# Patient Record
Sex: FEMALE | Race: WHITE | NOT HISPANIC OR LATINO | Employment: OTHER | URBAN - METROPOLITAN AREA
[De-identification: names, ages, dates, MRNs, and addresses within clinical notes are randomized per-mention and may not be internally consistent; named-entity substitution may affect disease eponyms.]

---

## 2023-11-13 ENCOUNTER — LAB REQUISITION (OUTPATIENT)
Dept: LAB | Facility: CLINIC | Age: 33
End: 2023-11-13
Payer: COMMERCIAL

## 2023-11-13 DIAGNOSIS — Z33.1 PREGNANT STATE, INCIDENTAL: ICD-10-CM

## 2023-11-13 LAB
ABO/RH(D): NORMAL
ANTIBODY SCREEN: NEGATIVE
BASOPHILS # BLD AUTO: 0 10E3/UL (ref 0–0.2)
BASOPHILS NFR BLD AUTO: 0 %
EOSINOPHIL # BLD AUTO: 0 10E3/UL (ref 0–0.7)
EOSINOPHIL NFR BLD AUTO: 0 %
ERYTHROCYTE [DISTWIDTH] IN BLOOD BY AUTOMATED COUNT: 12.7 % (ref 10–15)
HBV SURFACE AG SERPL QL IA: NONREACTIVE
HCT VFR BLD AUTO: 42.3 % (ref 35–47)
HCV AB SERPL QL IA: NONREACTIVE
HGB BLD-MCNC: 14.6 G/DL (ref 11.7–15.7)
HIV 1+2 AB+HIV1 P24 AG SERPL QL IA: NONREACTIVE
IMM GRANULOCYTES # BLD: 0 10E3/UL
IMM GRANULOCYTES NFR BLD: 0 %
LYMPHOCYTES # BLD AUTO: 1.8 10E3/UL (ref 0.8–5.3)
LYMPHOCYTES NFR BLD AUTO: 19 %
MCH RBC QN AUTO: 30.5 PG (ref 26.5–33)
MCHC RBC AUTO-ENTMCNC: 34.5 G/DL (ref 31.5–36.5)
MCV RBC AUTO: 89 FL (ref 78–100)
MONOCYTES # BLD AUTO: 0.4 10E3/UL (ref 0–1.3)
MONOCYTES NFR BLD AUTO: 5 %
NEUTROPHILS # BLD AUTO: 7 10E3/UL (ref 1.6–8.3)
NEUTROPHILS NFR BLD AUTO: 76 %
NRBC # BLD AUTO: 0 10E3/UL
NRBC BLD AUTO-RTO: 0 /100
PLATELET # BLD AUTO: 274 10E3/UL (ref 150–450)
RBC # BLD AUTO: 4.78 10E6/UL (ref 3.8–5.2)
SPECIMEN EXPIRATION DATE: NORMAL
WBC # BLD AUTO: 9.3 10E3/UL (ref 4–11)

## 2023-11-13 PROCEDURE — 87389 HIV-1 AG W/HIV-1&-2 AB AG IA: CPT | Mod: ORL | Performed by: OBSTETRICS & GYNECOLOGY

## 2023-11-13 PROCEDURE — 87086 URINE CULTURE/COLONY COUNT: CPT | Mod: ORL | Performed by: OBSTETRICS & GYNECOLOGY

## 2023-11-13 PROCEDURE — 86901 BLOOD TYPING SEROLOGIC RH(D): CPT | Mod: ORL | Performed by: OBSTETRICS & GYNECOLOGY

## 2023-11-13 PROCEDURE — 86850 RBC ANTIBODY SCREEN: CPT | Mod: ORL | Performed by: OBSTETRICS & GYNECOLOGY

## 2023-11-13 PROCEDURE — 85025 COMPLETE CBC W/AUTO DIFF WBC: CPT | Mod: ORL | Performed by: OBSTETRICS & GYNECOLOGY

## 2023-11-13 PROCEDURE — 87340 HEPATITIS B SURFACE AG IA: CPT | Mod: ORL | Performed by: OBSTETRICS & GYNECOLOGY

## 2023-11-13 PROCEDURE — 86803 HEPATITIS C AB TEST: CPT | Mod: ORL | Performed by: OBSTETRICS & GYNECOLOGY

## 2023-11-13 PROCEDURE — 86592 SYPHILIS TEST NON-TREP QUAL: CPT | Mod: ORL | Performed by: OBSTETRICS & GYNECOLOGY

## 2023-11-13 PROCEDURE — 86762 RUBELLA ANTIBODY: CPT | Mod: ORL | Performed by: OBSTETRICS & GYNECOLOGY

## 2023-11-14 LAB
BACTERIA UR CULT: NO GROWTH
RPR SER QL: NONREACTIVE
RUBV IGG SERPL QL IA: 21.4 INDEX
RUBV IGG SERPL QL IA: POSITIVE

## 2023-12-08 ENCOUNTER — LAB REQUISITION (OUTPATIENT)
Dept: LAB | Facility: CLINIC | Age: 33
End: 2023-12-08
Payer: COMMERCIAL

## 2023-12-08 DIAGNOSIS — Z36.89 ENCOUNTER FOR OTHER SPECIFIED ANTENATAL SCREENING: ICD-10-CM

## 2023-12-08 PROCEDURE — 82105 ALPHA-FETOPROTEIN SERUM: CPT | Mod: ORL | Performed by: OBSTETRICS & GYNECOLOGY

## 2023-12-11 LAB
# FETUSES US: NORMAL
AFP MOM SERPL: 0.82
AFP SERPL-MCNC: 31 NG/ML
AGE - REPORTED: 33.7 YR
CURRENT SMOKER: NO
FAMILY MEMBER DISEASES HX: NO
GA METHOD: NORMAL
GA: NORMAL WK
IDDM PATIENT QL: NO
INTEGRATED SCN PATIENT-IMP: NORMAL
SPECIMEN DRAWN SERPL: NORMAL

## 2024-04-25 ENCOUNTER — LAB REQUISITION (OUTPATIENT)
Dept: LAB | Facility: CLINIC | Age: 34
End: 2024-04-25
Payer: COMMERCIAL

## 2024-04-25 DIAGNOSIS — Z36.85 ENCOUNTER FOR ANTENATAL SCREENING FOR STREPTOCOCCUS B: ICD-10-CM

## 2024-04-25 PROCEDURE — 87653 STREP B DNA AMP PROBE: CPT | Mod: ORL | Performed by: STUDENT IN AN ORGANIZED HEALTH CARE EDUCATION/TRAINING PROGRAM

## 2024-04-26 LAB — GP B STREP DNA SPEC QL NAA+PROBE: NEGATIVE

## 2024-05-27 ENCOUNTER — HOSPITAL ENCOUNTER (INPATIENT)
Facility: CLINIC | Age: 34
LOS: 2 days | Discharge: HOME | End: 2024-05-29
Attending: OBSTETRICS & GYNECOLOGY | Admitting: STUDENT IN AN ORGANIZED HEALTH CARE EDUCATION/TRAINING PROGRAM
Payer: COMMERCIAL

## 2024-05-27 DIAGNOSIS — Z36.89 ENCOUNTER FOR TRIAGE IN PREGNANT PATIENT: Primary | ICD-10-CM

## 2024-05-27 LAB
ABO/RH(D): NORMAL
ANTIBODY SCREEN: NEGATIVE
ERYTHROCYTE [DISTWIDTH] IN BLOOD BY AUTOMATED COUNT: 12.5 % (ref 10–15)
HCT VFR BLD AUTO: 35.2 % (ref 35–47)
HGB BLD-MCNC: 12.3 G/DL (ref 11.7–15.7)
MCH RBC QN AUTO: 30.8 PG (ref 26.5–33)
MCHC RBC AUTO-ENTMCNC: 34.9 G/DL (ref 31.5–36.5)
MCV RBC AUTO: 88 FL (ref 78–100)
PLATELET # BLD AUTO: 218 10E3/UL (ref 150–450)
RBC # BLD AUTO: 3.99 10E6/UL (ref 3.8–5.2)
RUPTURE OF FETAL MEMBRANES BY ROM PLUS: POSITIVE
SPECIMEN EXPIRATION DATE: NORMAL
WBC # BLD AUTO: 13.3 10E3/UL (ref 4–11)

## 2024-05-27 PROCEDURE — 10907ZC DRAINAGE OF AMNIOTIC FLUID, THERAPEUTIC FROM PRODUCTS OF CONCEPTION, VIA NATURAL OR ARTIFICIAL OPENING: ICD-10-PCS | Performed by: STUDENT IN AN ORGANIZED HEALTH CARE EDUCATION/TRAINING PROGRAM

## 2024-05-27 PROCEDURE — 86900 BLOOD TYPING SEROLOGIC ABO: CPT | Performed by: OBSTETRICS & GYNECOLOGY

## 2024-05-27 PROCEDURE — 250N000009 HC RX 250: Performed by: OBSTETRICS & GYNECOLOGY

## 2024-05-27 PROCEDURE — G0463 HOSPITAL OUTPT CLINIC VISIT: HCPCS

## 2024-05-27 PROCEDURE — 258N000003 HC RX IP 258 OP 636: Performed by: OBSTETRICS & GYNECOLOGY

## 2024-05-27 PROCEDURE — 120N000001 HC R&B MED SURG/OB

## 2024-05-27 PROCEDURE — 36415 COLL VENOUS BLD VENIPUNCTURE: CPT | Performed by: OBSTETRICS & GYNECOLOGY

## 2024-05-27 PROCEDURE — 85027 COMPLETE CBC AUTOMATED: CPT | Performed by: OBSTETRICS & GYNECOLOGY

## 2024-05-27 PROCEDURE — 86780 TREPONEMA PALLIDUM: CPT | Performed by: OBSTETRICS & GYNECOLOGY

## 2024-05-27 PROCEDURE — 84112 EVAL AMNIOTIC FLUID PROTEIN: CPT | Performed by: OBSTETRICS & GYNECOLOGY

## 2024-05-27 RX ORDER — PROCHLORPERAZINE MALEATE 5 MG
10 TABLET ORAL EVERY 6 HOURS PRN
Status: DISCONTINUED | OUTPATIENT
Start: 2024-05-27 | End: 2024-05-27 | Stop reason: HOSPADM

## 2024-05-27 RX ORDER — CARBOPROST TROMETHAMINE 250 UG/ML
250 INJECTION, SOLUTION INTRAMUSCULAR
Status: DISCONTINUED | OUTPATIENT
Start: 2024-05-27 | End: 2024-05-28 | Stop reason: HOSPADM

## 2024-05-27 RX ORDER — ONDANSETRON 2 MG/ML
4 INJECTION INTRAMUSCULAR; INTRAVENOUS EVERY 6 HOURS PRN
Status: DISCONTINUED | OUTPATIENT
Start: 2024-05-27 | End: 2024-05-28 | Stop reason: HOSPADM

## 2024-05-27 RX ORDER — KETOROLAC TROMETHAMINE 30 MG/ML
30 INJECTION, SOLUTION INTRAMUSCULAR; INTRAVENOUS
Status: DISCONTINUED | OUTPATIENT
Start: 2024-05-27 | End: 2024-05-29 | Stop reason: HOSPADM

## 2024-05-27 RX ORDER — OXYTOCIN/0.9 % SODIUM CHLORIDE 30/500 ML
1-24 PLASTIC BAG, INJECTION (ML) INTRAVENOUS CONTINUOUS
Status: DISCONTINUED | OUTPATIENT
Start: 2024-05-27 | End: 2024-05-28 | Stop reason: HOSPADM

## 2024-05-27 RX ORDER — PROCHLORPERAZINE 25 MG
25 SUPPOSITORY, RECTAL RECTAL EVERY 12 HOURS PRN
Status: DISCONTINUED | OUTPATIENT
Start: 2024-05-27 | End: 2024-05-28 | Stop reason: HOSPADM

## 2024-05-27 RX ORDER — PROCHLORPERAZINE MALEATE 5 MG
10 TABLET ORAL EVERY 6 HOURS PRN
Status: DISCONTINUED | OUTPATIENT
Start: 2024-05-27 | End: 2024-05-28 | Stop reason: HOSPADM

## 2024-05-27 RX ORDER — LOPERAMIDE HCL 2 MG
2 CAPSULE ORAL
Status: DISCONTINUED | OUTPATIENT
Start: 2024-05-27 | End: 2024-05-28 | Stop reason: HOSPADM

## 2024-05-27 RX ORDER — LOPERAMIDE HCL 2 MG
4 CAPSULE ORAL
Status: DISCONTINUED | OUTPATIENT
Start: 2024-05-27 | End: 2024-05-28 | Stop reason: HOSPADM

## 2024-05-27 RX ORDER — NALOXONE HYDROCHLORIDE 0.4 MG/ML
0.2 INJECTION, SOLUTION INTRAMUSCULAR; INTRAVENOUS; SUBCUTANEOUS
Status: DISCONTINUED | OUTPATIENT
Start: 2024-05-27 | End: 2024-05-28 | Stop reason: HOSPADM

## 2024-05-27 RX ORDER — METOCLOPRAMIDE HYDROCHLORIDE 5 MG/ML
10 INJECTION INTRAMUSCULAR; INTRAVENOUS EVERY 6 HOURS PRN
Status: DISCONTINUED | OUTPATIENT
Start: 2024-05-27 | End: 2024-05-28 | Stop reason: HOSPADM

## 2024-05-27 RX ORDER — OXYTOCIN/0.9 % SODIUM CHLORIDE 30/500 ML
100-340 PLASTIC BAG, INJECTION (ML) INTRAVENOUS CONTINUOUS PRN
Status: DISCONTINUED | OUTPATIENT
Start: 2024-05-27 | End: 2024-05-29 | Stop reason: HOSPADM

## 2024-05-27 RX ORDER — METOCLOPRAMIDE 10 MG/1
10 TABLET ORAL EVERY 6 HOURS PRN
Status: DISCONTINUED | OUTPATIENT
Start: 2024-05-27 | End: 2024-05-28 | Stop reason: HOSPADM

## 2024-05-27 RX ORDER — TERBUTALINE SULFATE 1 MG/ML
0.25 INJECTION, SOLUTION SUBCUTANEOUS
Status: DISCONTINUED | OUTPATIENT
Start: 2024-05-27 | End: 2024-05-28 | Stop reason: HOSPADM

## 2024-05-27 RX ORDER — NALOXONE HYDROCHLORIDE 0.4 MG/ML
0.4 INJECTION, SOLUTION INTRAMUSCULAR; INTRAVENOUS; SUBCUTANEOUS
Status: DISCONTINUED | OUTPATIENT
Start: 2024-05-27 | End: 2024-05-28 | Stop reason: HOSPADM

## 2024-05-27 RX ORDER — OXYTOCIN 10 [USP'U]/ML
10 INJECTION, SOLUTION INTRAMUSCULAR; INTRAVENOUS
Status: DISCONTINUED | OUTPATIENT
Start: 2024-05-27 | End: 2024-05-29 | Stop reason: HOSPADM

## 2024-05-27 RX ORDER — SODIUM CHLORIDE, SODIUM LACTATE, POTASSIUM CHLORIDE, CALCIUM CHLORIDE 600; 310; 30; 20 MG/100ML; MG/100ML; MG/100ML; MG/100ML
INJECTION, SOLUTION INTRAVENOUS CONTINUOUS PRN
Status: DISCONTINUED | OUTPATIENT
Start: 2024-05-27 | End: 2024-05-28 | Stop reason: HOSPADM

## 2024-05-27 RX ORDER — METOCLOPRAMIDE HYDROCHLORIDE 5 MG/ML
10 INJECTION INTRAMUSCULAR; INTRAVENOUS EVERY 6 HOURS PRN
Status: DISCONTINUED | OUTPATIENT
Start: 2024-05-27 | End: 2024-05-27 | Stop reason: HOSPADM

## 2024-05-27 RX ORDER — MISOPROSTOL 200 UG/1
800 TABLET ORAL
Status: DISCONTINUED | OUTPATIENT
Start: 2024-05-27 | End: 2024-05-28 | Stop reason: HOSPADM

## 2024-05-27 RX ORDER — CITRIC ACID/SODIUM CITRATE 334-500MG
30 SOLUTION, ORAL ORAL
Status: COMPLETED | OUTPATIENT
Start: 2024-05-27 | End: 2024-05-28

## 2024-05-27 RX ORDER — SODIUM CHLORIDE, SODIUM LACTATE, POTASSIUM CHLORIDE, CALCIUM CHLORIDE 600; 310; 30; 20 MG/100ML; MG/100ML; MG/100ML; MG/100ML
INJECTION, SOLUTION INTRAVENOUS CONTINUOUS
Status: DISCONTINUED | OUTPATIENT
Start: 2024-05-27 | End: 2024-05-28 | Stop reason: HOSPADM

## 2024-05-27 RX ORDER — ONDANSETRON 4 MG/1
4 TABLET, ORALLY DISINTEGRATING ORAL EVERY 6 HOURS PRN
Status: DISCONTINUED | OUTPATIENT
Start: 2024-05-27 | End: 2024-05-28 | Stop reason: HOSPADM

## 2024-05-27 RX ORDER — IBUPROFEN 400 MG/1
800 TABLET, FILM COATED ORAL
Status: DISCONTINUED | OUTPATIENT
Start: 2024-05-27 | End: 2024-05-29 | Stop reason: HOSPADM

## 2024-05-27 RX ORDER — TRANEXAMIC ACID 10 MG/ML
1 INJECTION, SOLUTION INTRAVENOUS EVERY 30 MIN PRN
Status: DISCONTINUED | OUTPATIENT
Start: 2024-05-27 | End: 2024-05-28 | Stop reason: HOSPADM

## 2024-05-27 RX ORDER — PROCHLORPERAZINE 25 MG
25 SUPPOSITORY, RECTAL RECTAL EVERY 12 HOURS PRN
Status: DISCONTINUED | OUTPATIENT
Start: 2024-05-27 | End: 2024-05-27 | Stop reason: HOSPADM

## 2024-05-27 RX ORDER — MISOPROSTOL 200 UG/1
400 TABLET ORAL
Status: DISCONTINUED | OUTPATIENT
Start: 2024-05-27 | End: 2024-05-28 | Stop reason: HOSPADM

## 2024-05-27 RX ORDER — ONDANSETRON 4 MG/1
4 TABLET, ORALLY DISINTEGRATING ORAL EVERY 6 HOURS PRN
Status: DISCONTINUED | OUTPATIENT
Start: 2024-05-27 | End: 2024-05-27 | Stop reason: HOSPADM

## 2024-05-27 RX ORDER — ACETAMINOPHEN 325 MG/1
650 TABLET ORAL EVERY 4 HOURS PRN
Status: DISCONTINUED | OUTPATIENT
Start: 2024-05-27 | End: 2024-05-28 | Stop reason: HOSPADM

## 2024-05-27 RX ORDER — METOCLOPRAMIDE 10 MG/1
10 TABLET ORAL EVERY 6 HOURS PRN
Status: DISCONTINUED | OUTPATIENT
Start: 2024-05-27 | End: 2024-05-27 | Stop reason: HOSPADM

## 2024-05-27 RX ORDER — METHYLERGONOVINE MALEATE 0.2 MG/ML
200 INJECTION INTRAVENOUS
Status: DISCONTINUED | OUTPATIENT
Start: 2024-05-27 | End: 2024-05-28 | Stop reason: HOSPADM

## 2024-05-27 RX ORDER — OXYTOCIN/0.9 % SODIUM CHLORIDE 30/500 ML
340 PLASTIC BAG, INJECTION (ML) INTRAVENOUS CONTINUOUS PRN
Status: DISCONTINUED | OUTPATIENT
Start: 2024-05-27 | End: 2024-05-28 | Stop reason: HOSPADM

## 2024-05-27 RX ORDER — FENTANYL CITRATE 50 UG/ML
100 INJECTION, SOLUTION INTRAMUSCULAR; INTRAVENOUS
Status: DISCONTINUED | OUTPATIENT
Start: 2024-05-27 | End: 2024-05-28 | Stop reason: HOSPADM

## 2024-05-27 RX ORDER — OXYTOCIN 10 [USP'U]/ML
10 INJECTION, SOLUTION INTRAMUSCULAR; INTRAVENOUS
Status: DISCONTINUED | OUTPATIENT
Start: 2024-05-27 | End: 2024-05-28 | Stop reason: HOSPADM

## 2024-05-27 RX ORDER — LIDOCAINE 40 MG/G
CREAM TOPICAL
Status: DISCONTINUED | OUTPATIENT
Start: 2024-05-27 | End: 2024-05-28 | Stop reason: HOSPADM

## 2024-05-27 RX ORDER — ONDANSETRON 2 MG/ML
4 INJECTION INTRAMUSCULAR; INTRAVENOUS EVERY 6 HOURS PRN
Status: DISCONTINUED | OUTPATIENT
Start: 2024-05-27 | End: 2024-05-27 | Stop reason: HOSPADM

## 2024-05-27 RX ADMIN — Medication 2 MILLI-UNITS/MIN: at 23:07

## 2024-05-27 RX ADMIN — SODIUM CHLORIDE, POTASSIUM CHLORIDE, SODIUM LACTATE AND CALCIUM CHLORIDE: 600; 310; 30; 20 INJECTION, SOLUTION INTRAVENOUS at 22:51

## 2024-05-27 ASSESSMENT — ACTIVITIES OF DAILY LIVING (ADL)
ADLS_ACUITY_SCORE: 18

## 2024-05-27 NOTE — PROGRESS NOTES
Patient presents to [facility] ambulatory with  Peter. Brought to [facility] 3.    Monitors applied and vitals taken with consent. Here for r/o rupture of membranes      40w 4d  GBS -    Patient states she had a gush of clear fluid at 0415 in the bathroom. Had another gush at 0500 with more clear fluid with pink tinge. Has had continuous leaking since. Has had mild contractions, with tightening, irregular, mostly back pain.     ROM + collected with consent,    ROM+ positive result  SVE with consent, /-1    1600 Orders received from Octavio LORENZO to admit patient, start pitocin if patient consents, admission labs, update in 2 hours, anything for pain. Report given to Magdalena CHUA to assume primary care.

## 2024-05-27 NOTE — PROGRESS NOTES
I was in the [facility] department and asked by Magdalena Tapia, RN to perform a courtesy AROM of a forebag on behalf of the patient's primary care team and Dr. Cunningham.     AROM of a bulging forebag performed without complication and a small amount of clear fluid noted. SVE 3/80%/-1. Cat I FHTs.     JOSEFA Bradley CNM   [facility]  5/27/2024 at 6:19 PM

## 2024-05-27 NOTE — PLAN OF CARE
Data: Patient admitted to room 219 at 1615. Patient is a . Prenatal record reviewed.   OB History    Para Term  AB Living   1 0 0 0 0 0   SAB IAB Ectopic Multiple Live Births   0 0 0 0 0      # Outcome Date GA Lbr Steve/2nd Weight Sex Type Anes PTL Lv   1 Current            .  Medical History: History reviewed. No pertinent past medical history..  Gestational age 40w4d. Vital signs per doc flowsheet. Fetal movement present. Patient reports Rule out rupture of membranes   as reason for admission. Support persons Peter present.  Action: Report from TOMA Haider obtained at 1605. Care of patient assumed at 1605. Admission assessment completed. Patient and support persons educated on labor process. Patient instructed to report change in fetal movement, contractions, vaginal leaking of fluid or bleeding, abdominal pain, or any concerns related to the pregnancy to her nurse/physician. Patient oriented to room, call light in reach.   Response: Plan per provider is expectant management, pitocin augmentation if pt agreeable. Patient verbalized understanding of education and verbalized agreement with plan. Pt would like to wait 2 hours to reassess SVE before starting pitocin.

## 2024-05-28 ENCOUNTER — ANESTHESIA (OUTPATIENT)
Dept: OBGYN | Facility: CLINIC | Age: 34
End: 2024-05-28
Payer: COMMERCIAL

## 2024-05-28 ENCOUNTER — ANESTHESIA EVENT (OUTPATIENT)
Dept: OBGYN | Facility: CLINIC | Age: 34
End: 2024-05-28
Payer: COMMERCIAL

## 2024-05-28 LAB — T PALLIDUM AB SER QL: NONREACTIVE

## 2024-05-28 PROCEDURE — 0KQM0ZZ REPAIR PERINEUM MUSCLE, OPEN APPROACH: ICD-10-PCS | Performed by: STUDENT IN AN ORGANIZED HEALTH CARE EDUCATION/TRAINING PROGRAM

## 2024-05-28 PROCEDURE — 250N000009 HC RX 250: Performed by: OBSTETRICS & GYNECOLOGY

## 2024-05-28 PROCEDURE — 59400 OBSTETRICAL CARE: CPT | Performed by: ANESTHESIOLOGY

## 2024-05-28 PROCEDURE — 370N000003 HC ANESTHESIA WARD SERVICE: Performed by: ANESTHESIOLOGY

## 2024-05-28 PROCEDURE — 250N000011 HC RX IP 250 OP 636: Performed by: STUDENT IN AN ORGANIZED HEALTH CARE EDUCATION/TRAINING PROGRAM

## 2024-05-28 PROCEDURE — 250N000009 HC RX 250: Performed by: STUDENT IN AN ORGANIZED HEALTH CARE EDUCATION/TRAINING PROGRAM

## 2024-05-28 PROCEDURE — 250N000011 HC RX IP 250 OP 636: Performed by: ANESTHESIOLOGY

## 2024-05-28 PROCEDURE — 00HU33Z INSERTION OF INFUSION DEVICE INTO SPINAL CANAL, PERCUTANEOUS APPROACH: ICD-10-PCS | Performed by: ANESTHESIOLOGY

## 2024-05-28 PROCEDURE — 250N000013 HC RX MED GY IP 250 OP 250 PS 637: Performed by: OBSTETRICS & GYNECOLOGY

## 2024-05-28 PROCEDURE — 722N000001 HC LABOR CARE VAGINAL DELIVERY SINGLE

## 2024-05-28 PROCEDURE — 250N000013 HC RX MED GY IP 250 OP 250 PS 637: Performed by: STUDENT IN AN ORGANIZED HEALTH CARE EDUCATION/TRAINING PROGRAM

## 2024-05-28 PROCEDURE — 120N000012 HC R&B POSTPARTUM

## 2024-05-28 PROCEDURE — 3E0R3BZ INTRODUCTION OF ANESTHETIC AGENT INTO SPINAL CANAL, PERCUTANEOUS APPROACH: ICD-10-PCS | Performed by: ANESTHESIOLOGY

## 2024-05-28 RX ORDER — MISOPROSTOL 200 UG/1
400 TABLET ORAL
Status: DISCONTINUED | OUTPATIENT
Start: 2024-05-28 | End: 2024-05-29 | Stop reason: HOSPADM

## 2024-05-28 RX ORDER — ROPIVACAINE HYDROCHLORIDE 2 MG/ML
10 INJECTION, SOLUTION EPIDURAL; INFILTRATION; PERINEURAL ONCE
Status: DISCONTINUED | OUTPATIENT
Start: 2024-05-28 | End: 2024-05-28 | Stop reason: HOSPADM

## 2024-05-28 RX ORDER — NALBUPHINE HYDROCHLORIDE 20 MG/ML
2.5-5 INJECTION, SOLUTION INTRAMUSCULAR; INTRAVENOUS; SUBCUTANEOUS EVERY 6 HOURS PRN
Status: DISCONTINUED | OUTPATIENT
Start: 2024-05-28 | End: 2024-05-29 | Stop reason: HOSPADM

## 2024-05-28 RX ORDER — OXYTOCIN 10 [USP'U]/ML
10 INJECTION, SOLUTION INTRAMUSCULAR; INTRAVENOUS
Status: DISCONTINUED | OUTPATIENT
Start: 2024-05-28 | End: 2024-05-29 | Stop reason: HOSPADM

## 2024-05-28 RX ORDER — LOPERAMIDE HCL 2 MG
4 CAPSULE ORAL
Status: DISCONTINUED | OUTPATIENT
Start: 2024-05-28 | End: 2024-05-29 | Stop reason: HOSPADM

## 2024-05-28 RX ORDER — ROPIVACAINE HYDROCHLORIDE 2 MG/ML
INJECTION, SOLUTION EPIDURAL; INFILTRATION; PERINEURAL
Status: COMPLETED | OUTPATIENT
Start: 2024-05-28 | End: 2024-05-28

## 2024-05-28 RX ORDER — DOCUSATE SODIUM 100 MG/1
100 CAPSULE, LIQUID FILLED ORAL DAILY
Status: DISCONTINUED | OUTPATIENT
Start: 2024-05-28 | End: 2024-05-29 | Stop reason: HOSPADM

## 2024-05-28 RX ORDER — MISOPROSTOL 200 UG/1
800 TABLET ORAL
Status: DISCONTINUED | OUTPATIENT
Start: 2024-05-28 | End: 2024-05-29 | Stop reason: HOSPADM

## 2024-05-28 RX ORDER — METHYLERGONOVINE MALEATE 0.2 MG/ML
200 INJECTION INTRAVENOUS
Status: DISCONTINUED | OUTPATIENT
Start: 2024-05-28 | End: 2024-05-29 | Stop reason: HOSPADM

## 2024-05-28 RX ORDER — HYDROCORTISONE 25 MG/G
CREAM TOPICAL 3 TIMES DAILY PRN
Status: DISCONTINUED | OUTPATIENT
Start: 2024-05-28 | End: 2024-05-29 | Stop reason: HOSPADM

## 2024-05-28 RX ORDER — OXYTOCIN/0.9 % SODIUM CHLORIDE 30/500 ML
340 PLASTIC BAG, INJECTION (ML) INTRAVENOUS CONTINUOUS PRN
Status: DISCONTINUED | OUTPATIENT
Start: 2024-05-28 | End: 2024-05-29 | Stop reason: HOSPADM

## 2024-05-28 RX ORDER — ONDANSETRON 2 MG/ML
4 INJECTION INTRAMUSCULAR; INTRAVENOUS EVERY 6 HOURS PRN
Status: DISCONTINUED | OUTPATIENT
Start: 2024-05-28 | End: 2024-05-28 | Stop reason: HOSPADM

## 2024-05-28 RX ORDER — ONDANSETRON 4 MG/1
4 TABLET, ORALLY DISINTEGRATING ORAL EVERY 6 HOURS PRN
Status: DISCONTINUED | OUTPATIENT
Start: 2024-05-28 | End: 2024-05-28 | Stop reason: HOSPADM

## 2024-05-28 RX ORDER — ACETAMINOPHEN 325 MG/1
650 TABLET ORAL EVERY 4 HOURS PRN
Status: DISCONTINUED | OUTPATIENT
Start: 2024-05-28 | End: 2024-05-29 | Stop reason: HOSPADM

## 2024-05-28 RX ORDER — CARBOPROST TROMETHAMINE 250 UG/ML
250 INJECTION, SOLUTION INTRAMUSCULAR
Status: DISCONTINUED | OUTPATIENT
Start: 2024-05-28 | End: 2024-05-29 | Stop reason: HOSPADM

## 2024-05-28 RX ORDER — MODIFIED LANOLIN
OINTMENT (GRAM) TOPICAL
Status: DISCONTINUED | OUTPATIENT
Start: 2024-05-28 | End: 2024-05-29 | Stop reason: HOSPADM

## 2024-05-28 RX ORDER — LOPERAMIDE HCL 2 MG
2 CAPSULE ORAL
Status: DISCONTINUED | OUTPATIENT
Start: 2024-05-28 | End: 2024-05-29 | Stop reason: HOSPADM

## 2024-05-28 RX ORDER — TRANEXAMIC ACID 10 MG/ML
1 INJECTION, SOLUTION INTRAVENOUS EVERY 30 MIN PRN
Status: DISCONTINUED | OUTPATIENT
Start: 2024-05-28 | End: 2024-05-29 | Stop reason: HOSPADM

## 2024-05-28 RX ORDER — IBUPROFEN 400 MG/1
800 TABLET, FILM COATED ORAL EVERY 6 HOURS PRN
Status: DISCONTINUED | OUTPATIENT
Start: 2024-05-28 | End: 2024-05-29 | Stop reason: HOSPADM

## 2024-05-28 RX ORDER — FENTANYL CITRATE-0.9 % NACL/PF 10 MCG/ML
100 PLASTIC BAG, INJECTION (ML) INTRAVENOUS EVERY 5 MIN PRN
Status: DISCONTINUED | OUTPATIENT
Start: 2024-05-28 | End: 2024-05-28 | Stop reason: HOSPADM

## 2024-05-28 RX ORDER — BISACODYL 10 MG
10 SUPPOSITORY, RECTAL RECTAL DAILY PRN
Status: DISCONTINUED | OUTPATIENT
Start: 2024-05-28 | End: 2024-05-29 | Stop reason: HOSPADM

## 2024-05-28 RX ADMIN — MISOPROSTOL 800 MCG: 200 TABLET ORAL at 10:49

## 2024-05-28 RX ADMIN — ACETAMINOPHEN 650 MG: 325 TABLET, FILM COATED ORAL at 20:41

## 2024-05-28 RX ADMIN — Medication: at 03:49

## 2024-05-28 RX ADMIN — ACETAMINOPHEN 650 MG: 325 TABLET, FILM COATED ORAL at 13:28

## 2024-05-28 RX ADMIN — Medication 100 ML/HR: at 12:02

## 2024-05-28 RX ADMIN — IBUPROFEN 800 MG: 400 TABLET ORAL at 13:28

## 2024-05-28 RX ADMIN — METHYLERGONOVINE MALEATE 200 MCG: 0.2 INJECTION INTRAVENOUS at 11:22

## 2024-05-28 RX ADMIN — ROPIVACAINE HYDROCHLORIDE 10 ML: 2 INJECTION, SOLUTION EPIDURAL; INFILTRATION at 03:56

## 2024-05-28 RX ADMIN — IBUPROFEN 800 MG: 400 TABLET ORAL at 20:40

## 2024-05-28 RX ADMIN — TRANEXAMIC ACID 1 G: 10 INJECTION, SOLUTION INTRAVENOUS at 11:29

## 2024-05-28 RX ADMIN — DOCUSATE SODIUM 100 MG: 100 CAPSULE, LIQUID FILLED ORAL at 13:28

## 2024-05-28 RX ADMIN — SODIUM CITRATE AND CITRIC ACID MONOHYDRATE 30 ML: 500; 334 SOLUTION ORAL at 08:57

## 2024-05-28 ASSESSMENT — ACTIVITIES OF DAILY LIVING (ADL)
ADLS_ACUITY_SCORE: 18

## 2024-05-28 NOTE — PLAN OF CARE
Data: Radha See transferred to [facility] via wheelchair at 1300. Baby transferred via parent's arms.  Action: Receiving unit notified of transfer: Yes. Patient and family notified of room change. Report given to Jo-Ann at 1300. Belongings sent to receiving unit. Accompanied by Registered Nurse. Oriented patient to surroundings. Call light within reach. ID bands double-checked with receiving RN.  Response: Patient tolerated transfer and is stable.

## 2024-05-28 NOTE — PROVIDER NOTIFICATION
05/27/24 5562   Provider Notification   Provider Name/Title Dr. Cunningham   Method of Notification Phone   Notification Reason SVE;Status Update     MD notified SVE 4.5/80-90%/-1. Pt agreed to Pitocin. Pit started at 2 mu.

## 2024-05-28 NOTE — PROVIDER NOTIFICATION
05/28/24 0700   Provider Notification   Provider Name/Title Dr. Neely   Method of Notification Phone     MD called unit to inform RN that she will be following pt this AM. Plan is to call MD after shift change.

## 2024-05-28 NOTE — PROGRESS NOTES
RN assumed care of pt @ 1916. RN made aware that pt had been refusing pitocin and an IV. Pt requesting to go unmedicated and have a low intervention labor. Dr. Cunningham called at 2100 for a status update where RN confirmed pt continuing to refuse pitocin and an IV. MD unaware that pitocin was not started. MD stated he continues to recommend pitocin but if the pt refuses to recheck the pt when she becomes more uncomfortable. RN expressed Mds recommendations to pt and RN reviewed the risk of prolonged rupture of membranes. Pt agreeable to pitocin once educated on risks.

## 2024-05-28 NOTE — L&D DELIVERY NOTE
VAGINAL DELIVERY NOTE     SURGEON: Blanquita Kaur MD  PREOPERATIVE DIAGNOSIS: Single intrauterine pregnancy at 40w5d  POSTOPERATIVE DIAGNOSIS: Same, delivered  OPERATION PERFORMED: Normal spontaneous vaginal delivery  ANESTHESIA: Epidural  EBL: 385 mL  FLUIDS: Lactated Ringers at 125 mL/hour  URINE OUTPUT: Not measured  DRAINS: None  SPECIMENS: Cord blood   COMPLICATIONS: None  FINDINGS: Viable female infant weighing PENDING grams with APGARs of 8 and 9 at 1 and 5 minutes, respectively.  CONDITION: Both mother and infant stable in the immediate postpartum period. The patient remained in labor and delivery for recovery and the infant was kept in the room with her.  INDICATIONS: The patient is a 33 year old G1 who presented at 40 weeks estimated gestational age with SROM in early labor. She eventually did require pitocin for labor augmentation due to ineffective contraction pattern. Once pitocin was started, the patient progressed through active labor and had an epidural for pain control.     OPERATION: The patient progressed to complete/complete/+ 2 station and pushed for 180 minutes to deliver a viable female infant weighing PENDING grams with APGARs of 8 and 9 at one and five minutes, respectively via  over an intact perineum under epidural anesthesia. The baby delivered in occiput anterior position. There was a double nuchal cord that was reduced after delivery of the fetal head. The remainder of the body delivered without incident. Nose and mouth were bulb suctioned and the cord was clamped times two and cut. The baby was placed on the maternal abdomen. Placenta, membranes, and a three vessel cord delivered spontaneously and intact. Inspection of the perineum revealed a second degree perineal laceration that was repaired with 3-0 Vicryl in routine fashion with excellent hemostasis. The rectum, sulci, and cervix were inspected and noted to be intact. The fundus was firm with IV oxytocin and fundal massage.  800mcg cytotec was given AK as well to prevent further bleeding. There were no sponges left in the vagina.

## 2024-05-28 NOTE — ANESTHESIA PROCEDURE NOTES
"Epidural catheter Procedure Note    Pre-Procedure   Staff -        Anesthesiologist:  Rody Merrill MD       Performed By: anesthesiologist       Location: OB       Pre-Anesthestic Checklist: patient identified, IV checked, risks and benefits discussed, informed consent, monitors and equipment checked, pre-op evaluation and at physician/surgeon's request  Timeout:       Correct Patient: Yes        Correct Procedure: Yes        Correct Site: Yes        Correct Position: Yes   Procedure Documentation  Procedure: epidural catheter       Patient Position: sitting       Patient Prep/Sterile Barriers: sterile gloves, mask, patient draped       Skin prep: Betadine       Local skin infiltrated with 3 mL of 1% lidocaine.        Insertion Site: L3-4. (midline approach).       Technique: LORT saline        XIOMARA at 5 cm.       Needle Type: Feastiey needle       Needle Gauge: 17.        Needle Length (Inches): 3.5        Catheter: 19 G.          Catheter threaded easily.         4 cm epidural space.         Threaded 9 cm at skin.         # of attempts: 1 and  # of redirects:          : 0.    Assessment/Narrative         Paresthesias: No.       Test dose of 3 mL lidocaine 1.5% w/ 1:200,000 epinephrine at.         Test dose negative, 3 minutes after injection, for signs of intravascular, subdural, or intrathecal injection.       Insertion/Infusion Method: LORT saline       Aspiration negative for Heme or CSF via Epidural Catheter.    Medication(s) Administered   0.2% Ropivacaine (Epidural) - EPIDURAL   10 mL - 5/28/2024 3:56:00 AM   Comments:  Pt tolerated well.   Immediately to supine with CIRA.   FHTs stable post-procedure.   No complications.       FOR [facility] ([facility] [place], [place]) ONLY:   Pain Team Contact information: please page the Pain Team Via [employer]. Search \"Pain\". During daytime hours, please page the attending first. At night please page the resident first.      "

## 2024-05-28 NOTE — H&P
[facility] Labor and Delivery History and Physical    Radha See MRN# [id number]   Age: 33 year old YOB: 1990     Date of Admission:  2024    Primary care provider: Blanquita Kaur         Chief Complaint:   Radha See is a 33 year old G1 who is 40w5d pregnant and being admitted for SROM in early labor. She was 2cm dilated on arrival and made very slow progress to 4cm, eventually she did agree to pitocin augmentation of labor and has made progress since then. Pregnancy has been uncomplicated.           Pregnancy history:     OBSTETRIC HISTORY:    OB History    Para Term  AB Living   1 0 0 0 0 0   SAB IAB Ectopic Multiple Live Births   0 0 0 0 0      # Outcome Date GA Lbr Steve/2nd Weight Sex Type Anes PTL Lv   1 Current                EDC: Estimated Date of Delivery: May 23, 2024    Prenatal Labs:   Lab Results   Component Value Date    AS Negative 2024    HEPBANG Nonreactive 2023    HGB 12.3 2024       GBS Status:   Negative    Active Problem List  Patient Active Problem List   Diagnosis    Encounter for triage in pregnant patient    Indication for care in labor or delivery       Medication Prior to Admission  Medications Prior to Admission   Medication Sig Dispense Refill Last Dose    Prenatal Vit-Fe Fumarate-FA (PRENATAL MULTIVITAMIN  PLUS IRON) 27-1 MG TABS Take by mouth daily      .        Maternal Past Medical History:   History reviewed. No pertinent past medical history.                    Family History:   This patient has no significant family history            Social History:     Social History     Tobacco Use    Smoking status: Never    Smokeless tobacco: Never   Substance Use Topics    Alcohol use: Not on file            Review of Systems:   C: NEGATIVE for fever, chills, change in weight  E/M: NEGATIVE for ear, mouth and throat problems  R: NEGATIVE for significant cough or SOB  CV: NEGATIVE for chest pain, palpitations or  peripheral edema          Physical Exam:   Vitals were reviewed    Constitutional: Awake, alert, cooperative, no apparent distress, and appears stated age.  HEENT: Normocephalic, without obvious abnormality, atramatic  Neck: Supple, symmetrical  Back: Symmetric, no costal vertebral tenderness.  Lungs: No increased work of breathing, good air exchange, clear to auscultation bilaterally, no crackles or wheezing.  Cardiovascular: Regular rate and rhythm  Abdomen: Gravid. Non-tender  Genitourinary: No urethral discharge, normal external genitalia, no hernia.  Neurologic: Awake, alert, oriented to name, place and time.  Cranial nerves II-XII are grossly intact.    Psychiatric: Normal affect, mood  Skin: No rashes, erythema, pallor, petechia or purpura.     Cervix:   Membranes: SROM   Dilation: 10   Effacement: 100%   Station:+2    Presentation:Cephalic  Fetal Heart Rate Tracing: Tier 1 (normal)  Tocometer: external monitor                       Assessment:   Radha See is a G1 at 40w5d admitted with SROM in early labor, now pushing s/p labor augmentation        Plan:   Patient pushing now, making progress. Comfortable with epidural. Continue pushing efforts.     Blanquita Kaur MD

## 2024-05-28 NOTE — PLAN OF CARE
Goal Outcome Evaluation:    Plan of Care Reviewed With: patient    Overall Patient Progress: improving     Pt transferred to room 4402. Vital signs stable. Fundus firm and midline. Breastfeeding q 2-3 hours. Pain controlled with ibuprofen and tylenol. Pt voiding well. Ambulating in room and hallway. Will continue plan of care.

## 2024-05-28 NOTE — PROVIDER NOTIFICATION
05/28/24 0630   Provider Notification   Provider Name/Title Dr. Cunningham   Method of Notification Electronic Page;Phone   Notification Reason Status Update     MD notified pt is complete & +1 station. Pt is comfortable with epidural & not feeling pressure. Plan is to labor down for 45 mins-1 hr.

## 2024-05-28 NOTE — ANESTHESIA PREPROCEDURE EVALUATION
"Anesthesia Pre-Procedure Evaluation    Patient: Radha See   MRN: [id number] : 1990        Procedure :           History reviewed. No pertinent past medical history.   Past Surgical History:   Procedure Laterality Date    LUMPECTOMY BREAST Right       Allergies   Allergen Reactions    Amoxicillin Hives    Penicillins Hives      Social History     Tobacco Use    Smoking status: Never    Smokeless tobacco: Never   Substance Use Topics    Alcohol use: Not on file      Wt Readings from Last 1 Encounters:   No data found for Wt        Anesthesia Evaluation            ROS/MED HX  ENT/Pulmonary:    (-) asthma   Neurologic:  - neg neurologic ROS     Cardiovascular:    (-) PIH   METS/Exercise Tolerance:     Hematologic:     (+)  no anticoagulation therapy, no coagulopathy,             Musculoskeletal:       GI/Hepatic:     (+) GERD,                   Renal/Genitourinary:       Endo:       Psychiatric/Substance Use:       Infectious Disease:       Malignancy:       Other:            Physical Exam    Airway        Mallampati: II   TM distance: > 3 FB   Neck ROM: full     Respiratory Devices and Support         Dental  no notable dental history         Cardiovascular   cardiovascular exam normal          Pulmonary   pulmonary exam normal                OUTSIDE LABS:  CBC:   Lab Results   Component Value Date    WBC 13.3 (H) 2024    WBC 9.3 2023    HGB 12.3 2024    HGB 14.6 2023    HCT 35.2 2024    HCT 42.3 2023     2024     2023     BMP: No results found for: \"NA\", \"POTASSIUM\", \"CHLORIDE\", \"CO2\", \"BUN\", \"CR\", \"GLC\"  COAGS: No results found for: \"PTT\", \"INR\", \"FIBR\"  POC: No results found for: \"BGM\", \"HCG\", \"HCGS\"  HEPATIC: No results found for: \"ALBUMIN\", \"PROTTOTAL\", \"ALT\", \"AST\", \"GGT\", \"ALKPHOS\", \"BILITOTAL\", \"BILIDIRECT\", \"HALEY\"  OTHER: No results found for: \"PH\", \"LACT\", \"A1C\", \"MONIQUE\", \"PHOS\", \"MAG\", \"LIPASE\", \"AMYLASE\", \"TSH\", \"T4\", \"T3\", \"CRP\", " "\"SED\"    Anesthesia Plan    ASA Status:  2       Anesthesia Type: Epidural.              Consents    Anesthesia Plan(s) and associated risks, benefits, and realistic alternatives discussed. Questions answered and patient/representative(s) expressed understanding.     - Discussed:     - Discussed with:  Patient            Postoperative Care            Comments:    Other Comments: Orders to manage the epidural infusion have been entered, and through coordination with the nurse, we will continute to manage and monitor the patient's labor epidural.  We will continuously be available to adjust as needed thruout the entire L&D process.            Rody Merrill MD, MD    I have reviewed the pertinent notes and labs in the chart from the past 30 days and (re)examined the patient.  Any updates or changes from those notes are reflected in this note.                  "

## 2024-05-28 NOTE — PROVIDER NOTIFICATION
05/28/24 0430   Provider Notification   Provider Name/Title Dr. Cunningham   Method of Notification Electronic Page;Phone   Notification Reason SVE;Status Update     MD notified SVE 7.5/80-90%/-1. Pt comfortable with epidural. Pit at 6. FHR baseline 140 w/moderate variability & occasional early decel. Plan is to recheck cervix in 2 hrs.

## 2024-05-28 NOTE — LACTATION NOTE
Initial lactation visit with Radha. Radha shares that infant  well after delivery.     Reviewed normal  feeding behaviors including sleepiness first 24 hours followed by increased alertness and cluster feeding. Encouraged use of feeding log for first few weeks until breastfeeding well established.  Reviewed feeding cues and encouraged family to attempt to breast feed when infant cueing, ensuring 8 or more feedings every 24 hours. Encouraged patient to call for latch check/feeding assist from RNs/IBCLC as needed. Will revisit as needed.

## 2024-05-28 NOTE — PLAN OF CARE
of viable baby girl with Dr Kaur at bedside for delivery. Apgars 8/9. Fundus boggy after delivery, meds given, see MAR. Firm @U, light rubra after medications. Ice to perineum. Bonding well with baby, working on breastfeeding.

## 2024-05-29 VITALS
SYSTOLIC BLOOD PRESSURE: 112 MMHG | RESPIRATION RATE: 18 BRPM | HEART RATE: 60 BPM | WEIGHT: 153.8 LBS | TEMPERATURE: 97.8 F | OXYGEN SATURATION: 99 % | DIASTOLIC BLOOD PRESSURE: 71 MMHG

## 2024-05-29 PROCEDURE — 250N000013 HC RX MED GY IP 250 OP 250 PS 637: Performed by: STUDENT IN AN ORGANIZED HEALTH CARE EDUCATION/TRAINING PROGRAM

## 2024-05-29 RX ORDER — ACETAMINOPHEN 325 MG/1
650 TABLET ORAL EVERY 4 HOURS PRN
COMMUNITY
Start: 2024-05-29

## 2024-05-29 RX ORDER — DOCUSATE SODIUM 100 MG/1
100 CAPSULE, LIQUID FILLED ORAL DAILY
COMMUNITY
Start: 2024-05-30

## 2024-05-29 RX ORDER — IBUPROFEN 800 MG/1
800 TABLET, FILM COATED ORAL EVERY 6 HOURS PRN
COMMUNITY
Start: 2024-05-29

## 2024-05-29 RX ADMIN — DOCUSATE SODIUM 100 MG: 100 CAPSULE, LIQUID FILLED ORAL at 07:55

## 2024-05-29 ASSESSMENT — ACTIVITIES OF DAILY LIVING (ADL)
ADLS_ACUITY_SCORE: 18

## 2024-05-29 NOTE — PLAN OF CARE
D: VSS, assessments WDL. Declines pain meds. Using IP and Tucks. Breastfeeding her baby girl who is latching well. Pt. wants to discharge at 24hrs.   I: Pt. received complete discharge paperwork.  Pt. was given times of last dose for all discharge medications in writing on discharge medication sheets.  Discharge teaching included home medication, pain management, activity restrictions, postpartum cares, and signs and symptoms of infection.    A: Discharge outcomes on care plan met.  Mother states understanding and comfort with self care and follow up care.   P: Pt. Discharged.  Pt. was accompanied by  and left with personal belongings. Pt. to follow up with OB provider per discharge instructions.  Pt. had no further questions at the time of discharge and no unmet needs were identified.  Goal Outcome Evaluation:

## 2024-05-29 NOTE — ANESTHESIA POSTPROCEDURE EVALUATION
Patient: Radha See    Procedure: * No procedures listed *       Anesthesia Type:  Epidural    Note:     Postop Pain Control: Uneventful            Sign Out: Well controlled pain   PONV: No   Neuro/Psych: Uneventful            Sign Out: Acceptable/Baseline neuro status   Airway/Respiratory: Uneventful            Sign Out: Acceptable/Baseline resp. status   CV/Hemodynamics: Uneventful            Sign Out: Acceptable CV status; No obvious hypovolemia; No obvious fluid overload   Other NRE: NONE   DID A NON-ROUTINE EVENT OCCUR? No           Last vitals:  Vitals:    05/29/24 0010 05/29/24 0445 05/29/24 0757   BP: 106/69 90/52 112/71   Pulse: 60 52 60   Resp: 16 16 18   Temp: 36.6  C (97.9  F)  36.6  C (97.8  F)   SpO2:          Electronically Signed By: Rozina Virgen MD  May 29, 2024  4:52 PM

## 2024-05-29 NOTE — PROGRESS NOTES
[facility]       DAILY NOTE - POSTPARTUM DAY 1     SUBJECTIVE:     Pain controlled? Yes  Tolerating a regular diet? YES  Ambulating? YES  Voiding without difficulty? Yes    OBJECTIVE:  Vitals:    24 2041 24 0010 24 0445 24 0757   BP: 116/69 106/69 90/52 112/71   BP Location: Right arm Right arm Right arm    Patient Position: Sitting Semi-Gómez's Semi-Gómez's    Cuff Size:  Adult Regular Adult Regular    Pulse: 77 60 52 60   Resp: 16 16 16 18   Temp:  97.9  F (36.6  C)  97.8  F (36.6  C)   TempSrc:  Oral  Oral   SpO2:           Constitutional: healthy, alert, and no distress    Abdomen:  Uterine fundus is firm, non-tender and at the level of the umbilicus     Lower extremities: no edema      LABS:  Hemoglobin   Date Value Ref Range Status   2024 12.3 11.7 - 15.7 g/dL Final   2023 14.6 11.7 - 15.7 g/dL Final       ASSESSMENT:  Post-partum day #1 s/p   Pregnancy complicated by NO COMPLICATIONS    Doing well.       PLAN:   Discharge today.  Return to clinic in 6 weeks.   Continue routine postpartum cares    Kathy Alanis PA-C

## 2024-05-29 NOTE — PLAN OF CARE
Goal Outcome Evaluation:      Plan of Care Reviewed With: patient, spouse    Overall Patient Progress: improvingOverall Patient Progress: improving       Pt states that pain is being well managed. Pt is up and moving independently, voiding well. Vitally stable. Fundus is firm with minimal lochia present. Bonding well with . Plan of care continues.

## 2024-05-29 NOTE — DISCHARGE INSTRUCTIONS
"Postpartum Care at Home With Your Baby: Care Instructions  Overview     After childbirth (postpartum period), your body goes through many changes as you recover. In these weeks after delivery, try to take good care of yourself. Get rest whenever you can and accept help from others.  It may take 4 to 6 weeks to feel like yourself again, and possibly longer if you had a  birth. You may feel sore or very tired as you recover. After delivery, you may continue to have contractions as the uterus returns to the size it was before your pregnancy. You will also have some vaginal bleeding. And you may have pain around the vagina as you heal. Several days after delivery you may also have pain and swelling in your breasts as they fill with milk. There are things you can do at home to help ease these discomforts.  After childbirth, it's common to feel emotional. You may feel irritable, cry easily, and feel happy one minute and sad the next. This is called the \"baby blues.\" Hormone changes are one cause of these emotional changes. These feelings usually get better within a couple of weeks. If they don't, talk to your doctor or midwife.  In the first couple of weeks after you give birth, your doctor or midwife may want to check in with you and make a plan for follow-up care. You will likely have a complete postpartum visit in the first 3 months after delivery. At that time, your doctor or midwife will check on your recovery and see how you're doing. But if you have questions or concerns before then, you can always call your doctor or midwife.  Follow-up care is a key part of your treatment and safety. Be sure to make and go to all appointments, and call your doctor if you are having problems. It's also a good idea to know your test results and keep a list of the medicines you take.  How can you care for yourself at home?  Taking care of your body  Use pads instead of tampons for bleeding. After birth, you will have bloody " vaginal discharge. You may also pass some blood clots that shouldn't be bigger than an egg. Over the next 6 weeks or so, your bleeding should decrease a little every day and slowly change to a pinkish and then whitish discharge.  For cramps or mild pain, try an over-the-counter pain medicine, such as acetaminophen (Tylenol) or ibuprofen (Advil, Motrin). Read and follow all instructions on the label.  To ease pain around the vagina or from hemorrhoids:  Put ice or a cold pack on the area for 10 to 20 minutes at a time. Put a thin cloth between the ice and your skin.  Try sitting in a few inches of warm water (sitz bath) when you can or after bowel movements.  Clean yourself with a gentle squeeze of warm water from a bottle instead of wiping with toilet paper.  Use witch hazel or hemorrhoid pads (such as Tucks).  Try using a cold compress for sore and swollen breasts. And wear a supportive bra that fits.  Ease constipation by drinking plenty of fluids and eating high-fiber foods. Ask your doctor or midwife about over-the-counter stool softeners.  Activity  Rest when you can.  Ask for help from family or friends when you need it.  If you can, have another adult in your home for at least 2 or 3 days after birth.  When you feel ready, try to get some exercise every day. For many people, walking is a good choice. Don't do any heavy exercise until your doctor or midwife says it's okay.  Ask your doctor or midwife when it is okay to have vaginal sex.  If you don't want to get pregnant, talk to your doctor or midwife about birth control options. You can get pregnant even before your period returns. Also, you can get pregnant while you are breastfeeding.  Talk to your doctor or midwife if you want to get pregnant again. They can talk to you about when it is safe.  Emotional health  It's normal to have some sadness, anxiety, and mood swings after delivery. It may help to talk with a trusted friend or family member. You can  also call the Maternal Mental Health Hotline at 2-236-NCF-MAMA (1-917.848.8294) for support. If these mood changes last more than a couple of weeks, talk to your doctor or midwife.  When should you call for help?  Share this information with your partner, family, or a friend. They can help you watch for warning signs.  Call 911  anytime you think you may need emergency care. For example, call if:    You feel you cannot stop from hurting yourself, your baby, or someone else.     You passed out (lost consciousness).     You have chest pain, are short of breath, or cough up blood.     You have a seizure.   Where to get help 24 hours a day, 7 days a week   If you or someone you know talks about suicide, self-harm, a mental health crisis, a substance use crisis, or any other kind of emotional distress, get help right away. You can:    Call the Suicide and Crisis Lifeline at 988.     Call 9-463-557-TALK (1-786.464.5794).     Text HOME to 822617 to access the Crisis Text Line.   Consider saving these numbers in your phone.  Go to [employer] for more information or to chat online.  Call your doctor or midwife now or seek immediate medical care if:    You have signs of hemorrhage (too much bleeding), such as:  Heavy vaginal bleeding. This means that you are soaking through one or more pads in an hour. Or you pass blood clots bigger than an egg.  Feeling dizzy or lightheaded, or you feel like you may faint.  Feeling so tired or weak that you cannot do your usual activities.  A fast or irregular heartbeat.  New or worse belly pain.     You have signs of infection, such as:  A fever.  Increased pain, swelling, warmth, or redness from an incision or wound.  Frequent or painful urination or blood in your urine.  Vaginal discharge that smells bad.  New or worse belly pain.     You have symptoms of a blood clot in your leg (called a deep vein thrombosis), such as:  Pain in the calf, back of the knee, thigh, or groin.  Swelling  "in the leg or groin.  A color change on the leg or groin. The skin may be reddish or purplish, depending on your usual skin color.     You have signs of preeclampsia, such as:  Sudden swelling of your face, hands, or feet.  New vision problems (such as dimness, blurring, or seeing spots).  A severe headache.     You have signs of heart failure, such as:  New or increased shortness of breath.  New or worse swelling in your legs, ankles, or feet.  Sudden weight gain, such as more than 2 to 3 pounds in a day or 5 pounds in a week.  Feeling so tired or weak that you cannot do your usual activities.     You had spinal or epidural pain relief and have:  New or worse back pain.  Increased pain, swelling, warmth, or redness at the injection site.  Tingling, weakness, or numbness in your legs or groin.   Watch closely for changes in your health, and be sure to contact your doctor or midwife if:    Your vaginal bleeding isn't decreasing.     You feel sad, anxious, or hopeless for more than a few days.     You are having problems with your breasts or breastfeeding.   Where can you learn more?  Go to https://www.[employer].net/patiented  Enter Z768 in the search box to learn more about \"Postpartum Care at Home With Your Baby: Care Instructions.\"  Current as of: July 10, 2023               Content Version: 14.0    7739-7719 [employer].   Care instructions adapted under license by your healthcare professional. If you have questions about a medical condition or this instruction, always ask your healthcare professional. [employer] disclaims any warranty or liability for your use of this information.      "

## 2024-05-29 NOTE — LACTATION NOTE
"Lactation visit with Radha, MARGOT, and baby girl.    Family would like to discharge home today, Radha mentions latching infant on the RR breast has been more challenging. Radha stating, \"this nipple is a little inverted and I think it's harder for her to latch\". Upon observation, Radha's nipple is very slightly inverted at the tip but nothing that should create a breast feeding issue. We worked on how to best support \"sandwich\" breast tissue to help infant obtain a deep latch. Cross body on the R side worked well to get infant latched and with the breast support in \"U\" hold, infant develops into nutritive suckling pattern. Infant eventually unlatched herself after about 15 minutes, suggest Radha bring infant to her chest, offer to burp her and then offer the other breast. Infant nursed well on L breast as well! Radha feels more secure for discharge today.      Reviewed breast feeding section in our \"Guide to Postpartum and  Care.\" Highlighting pages that educates to  feeding patterns/behavior: Day 1 infant may be more sleepy (the birthday nap); followed by cluster-feeding (breastfeeding marathon) on second day/night. We reviewed the feeding log in back of booklet, how/why tracking infant's feedings and wet/dirty diapers is important. Provided Carlos suggestions for tracking beyond day 5.     Recommended infant is offered both breasts with every feeding session. Educated on nutritive vs non-nutritive suckling patterns and \"how to know infant is getting enough\". Reviewed breastfeeding positions and techniques to obtain/maintain deep latch, including nose to nipple alignment and how to support infant's shoulder blades and neck to allow flexion for optimal latch positioning. Discussed breastfeeding with a correct latch should feel like a strong \"tug or pull\". If infant's suckling feels more like a \"pinch or bite\", an adjustment to infant's latch is needed. Demonstrated how support person can assist to gently " "pull down on infant's chin to increase depth of latch. And if needed un-latch infant properly (techniques given), assess nipple shape and make any necessary adjustments with positioning before re-latching.     Discussed physiology of milk production from colostrum through milk \"coming in\" between day 3-5 (typically); emphasizing adequate stimulation to the breast is what causes this change to occur. Answered questions regarding \"how to know when infant is done at the breast\". Discussed listening for infant to have audible swallows along with watching for changes in infant's stool color. Discussed normal infant weight loss and when infant should be back to birth weight. Stressed the importance of continuing to track infant's feeds and void/stools patterns, at least until infant has returned to his birth weight.    Answered general pumping questions, finding correct flange size, etc. Offered ideas on how to \"build milk storage\", ie: pumping once infant is about one month of age (following first morning breast-feed). Educated on when to offer a bottle. Radha has a new breast pump for home use.     Recommended to utilize our \"Guide to Postpartum and  Care\" handbook as great resource for discharge.     Feeding plan recommendations: provide unlimited, on-demand breast feedings: At least 8-12 times/24 hours (reviewed early feeding cues). Suggested pumping if baby has a poor feeding or if supplementation is necessary. Encouraged on-going use of a feeding log or timbo to record feedings along with void/stool patterns.Follow up with Pediatrician as requested and encouraged lactation follow up. Reviewed [facility] outpatient lactation resources. Appreciative of visit.    Sharon Rodriguez RN, IBCLC          "

## 2024-06-07 ENCOUNTER — LAB REQUISITION (OUTPATIENT)
Dept: LAB | Facility: CLINIC | Age: 34
End: 2024-06-07
Payer: COMMERCIAL

## 2024-06-07 DIAGNOSIS — R03.0 ELEVATED BLOOD-PRESSURE READING, WITHOUT DIAGNOSIS OF HYPERTENSION: ICD-10-CM

## 2024-06-07 LAB
ALBUMIN MFR UR ELPH: 12 MG/DL
CREAT UR-MCNC: 25.5 MG/DL
CREAT UR-MCNC: 25.5 MG/DL
ERYTHROCYTE [DISTWIDTH] IN BLOOD BY AUTOMATED COUNT: 12.8 % (ref 10–15)
HCT VFR BLD AUTO: 40.5 % (ref 35–47)
HGB BLD-MCNC: 13.5 G/DL (ref 11.7–15.7)
MCH RBC QN AUTO: 29.9 PG (ref 26.5–33)
MCHC RBC AUTO-ENTMCNC: 33.3 G/DL (ref 31.5–36.5)
MCV RBC AUTO: 90 FL (ref 78–100)
PLATELET # BLD AUTO: 482 10E3/UL (ref 150–450)
PROT/CREAT 24H UR: 0.47 MG/MG CR (ref 0–0.2)
RBC # BLD AUTO: 4.51 10E6/UL (ref 3.8–5.2)
WBC # BLD AUTO: 10.8 10E3/UL (ref 4–11)

## 2024-06-07 PROCEDURE — 82565 ASSAY OF CREATININE: CPT | Mod: ORL | Performed by: STUDENT IN AN ORGANIZED HEALTH CARE EDUCATION/TRAINING PROGRAM

## 2024-06-07 PROCEDURE — 84156 ASSAY OF PROTEIN URINE: CPT | Mod: ORL | Performed by: STUDENT IN AN ORGANIZED HEALTH CARE EDUCATION/TRAINING PROGRAM

## 2024-06-07 PROCEDURE — 84450 TRANSFERASE (AST) (SGOT): CPT | Mod: ORL | Performed by: STUDENT IN AN ORGANIZED HEALTH CARE EDUCATION/TRAINING PROGRAM

## 2024-06-07 PROCEDURE — 82570 ASSAY OF URINE CREATININE: CPT | Mod: ORL | Performed by: STUDENT IN AN ORGANIZED HEALTH CARE EDUCATION/TRAINING PROGRAM

## 2024-06-07 PROCEDURE — 84460 ALANINE AMINO (ALT) (SGPT): CPT | Mod: ORL | Performed by: STUDENT IN AN ORGANIZED HEALTH CARE EDUCATION/TRAINING PROGRAM

## 2024-06-07 PROCEDURE — 85027 COMPLETE CBC AUTOMATED: CPT | Mod: ORL | Performed by: STUDENT IN AN ORGANIZED HEALTH CARE EDUCATION/TRAINING PROGRAM

## 2024-06-08 LAB
ALT SERPL W P-5'-P-CCNC: 47 U/L (ref 0–50)
AST SERPL W P-5'-P-CCNC: 29 U/L (ref 0–45)
CREAT SERPL-MCNC: 0.78 MG/DL (ref 0.51–0.95)
EGFRCR SERPLBLD CKD-EPI 2021: >90 ML/MIN/1.73M2

## 2024-07-28 ENCOUNTER — HEALTH MAINTENANCE LETTER (OUTPATIENT)
Age: 34
End: 2024-07-28

## 2025-08-10 ENCOUNTER — HEALTH MAINTENANCE LETTER (OUTPATIENT)
Age: 35
End: 2025-08-10